# Patient Record
Sex: MALE | URBAN - METROPOLITAN AREA
[De-identification: names, ages, dates, MRNs, and addresses within clinical notes are randomized per-mention and may not be internally consistent; named-entity substitution may affect disease eponyms.]

---

## 2022-03-22 ENCOUNTER — OFFICE VISIT (OUTPATIENT)
Dept: URBAN - METROPOLITAN AREA CLINIC 23 | Facility: CLINIC | Age: 37
End: 2022-03-22
Payer: COMMERCIAL

## 2022-03-22 DIAGNOSIS — H18.832 RECURRENT EROSION OF CORNEA, LEFT EYE: Primary | ICD-10-CM

## 2022-03-22 PROCEDURE — 99204 OFFICE O/P NEW MOD 45 MIN: CPT | Performed by: OPTOMETRIST

## 2022-03-22 RX ORDER — TOBRAMYCIN AND DEXAMETHASONE 3; 1 MG/ML; MG/ML
SUSPENSION/ DROPS OPHTHALMIC
Qty: 5 | Refills: 0 | Status: ACTIVE
Start: 2022-03-22

## 2022-03-22 ASSESSMENT — KERATOMETRY
OD: 42.88
OS: 42.88

## 2022-03-22 NOTE — IMPRESSION/PLAN
Impression: Recurrent erosion of cornea, left eye: H18.832. Plan: Pt edu. BCL placed today. OLEGARIO 8.8 -0.50. Rx tobradex tid os for 14 days.   Pres free ats q1-2 h.  RTC friday for BCL removal.

## 2022-03-25 ENCOUNTER — OFFICE VISIT (OUTPATIENT)
Dept: URBAN - METROPOLITAN AREA CLINIC 23 | Facility: CLINIC | Age: 37
End: 2022-03-25
Payer: COMMERCIAL

## 2022-03-25 PROCEDURE — 99212 OFFICE O/P EST SF 10 MIN: CPT | Performed by: OPTOMETRIST

## 2022-03-25 ASSESSMENT — KERATOMETRY
OD: 42.75
OS: 42.50

## 2022-03-25 NOTE — IMPRESSION/PLAN
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 Plan: Removed BCL OD in office today. Well tolerated. Continue Tobradex as prescribed. Start AT's PRN OU for comfort. Advised patient to call if symptoms worsen, do not resolve or reoccur.
Event Note

## 2022-06-27 ENCOUNTER — OFFICE VISIT (OUTPATIENT)
Dept: URBAN - METROPOLITAN AREA CLINIC 23 | Facility: CLINIC | Age: 37
End: 2022-06-27
Payer: COMMERCIAL

## 2022-06-27 DIAGNOSIS — H18.832 RECURRENT EROSION OF CORNEA, LEFT EYE: Primary | ICD-10-CM

## 2022-06-27 PROCEDURE — 99212 OFFICE O/P EST SF 10 MIN: CPT | Performed by: OPTOMETRIST

## 2022-06-27 ASSESSMENT — KERATOMETRY
OS: 43.00
OD: 43.00

## 2022-06-27 NOTE — IMPRESSION/PLAN
Impression: Recurrent erosion of cornea, left eye: H18.832. Plan: No active erosion today. Recommended jamie 128 qhs for preventions. ATS prn. Edu on surgical options that may be necessary in the future. Rx AV oasys for BCL pt may use up to one week if pt is traveling and cannot come into office right away. Pt instructed to discontinue prn bcl use if symptoms worsen with use. RTC prn. Corneal consult if issues are recurrent. chest pain x 1 day, worse with deep breathing and movement, dizzy and fatigue/no pain at present